# Patient Record
Sex: MALE | Race: OTHER | NOT HISPANIC OR LATINO
[De-identification: names, ages, dates, MRNs, and addresses within clinical notes are randomized per-mention and may not be internally consistent; named-entity substitution may affect disease eponyms.]

---
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
## 2024-05-09 ENCOUNTER — EMERGENCY (EMERGENCY)
Facility: HOSPITAL | Age: 19
LOS: 0 days | Discharge: HOME | End: 2024-05-10
Attending: EMERGENCY MEDICINE
Payer: MEDICAID

## 2024-05-09 VITALS
WEIGHT: 154.98 LBS | HEART RATE: 73 BPM | TEMPERATURE: 98 F | SYSTOLIC BLOOD PRESSURE: 172 MMHG | OXYGEN SATURATION: 100 % | DIASTOLIC BLOOD PRESSURE: 81 MMHG | RESPIRATION RATE: 18 BRPM

## 2024-05-09 DIAGNOSIS — G47.00 INSOMNIA, UNSPECIFIED: ICD-10-CM

## 2024-05-09 DIAGNOSIS — F43.22 ADJUSTMENT DISORDER WITH ANXIETY: ICD-10-CM

## 2024-05-09 DIAGNOSIS — Z20.822 CONTACT WITH AND (SUSPECTED) EXPOSURE TO COVID-19: ICD-10-CM

## 2024-05-09 DIAGNOSIS — R45.851 SUICIDAL IDEATIONS: ICD-10-CM

## 2024-05-09 LAB
ANION GAP SERPL CALC-SCNC: 10 MMOL/L — SIGNIFICANT CHANGE UP (ref 7–14)
APAP SERPL-MCNC: <5 UG/ML — LOW (ref 10–30)
APPEARANCE UR: CLEAR — SIGNIFICANT CHANGE UP
BASOPHILS # BLD AUTO: 0.07 K/UL — SIGNIFICANT CHANGE UP (ref 0–0.2)
BASOPHILS NFR BLD AUTO: 1 % — SIGNIFICANT CHANGE UP (ref 0–1)
BILIRUB UR-MCNC: NEGATIVE — SIGNIFICANT CHANGE UP
BUN SERPL-MCNC: 18 MG/DL — SIGNIFICANT CHANGE UP (ref 10–20)
CALCIUM SERPL-MCNC: 9.5 MG/DL — SIGNIFICANT CHANGE UP (ref 8.4–10.5)
CHLORIDE SERPL-SCNC: 104 MMOL/L — SIGNIFICANT CHANGE UP (ref 98–110)
CO2 SERPL-SCNC: 26 MMOL/L — SIGNIFICANT CHANGE UP (ref 17–32)
COLOR SPEC: YELLOW — SIGNIFICANT CHANGE UP
CREAT SERPL-MCNC: 1 MG/DL — SIGNIFICANT CHANGE UP (ref 0.7–1.5)
DIFF PNL FLD: NEGATIVE — SIGNIFICANT CHANGE UP
EGFR: 111 ML/MIN/1.73M2 — SIGNIFICANT CHANGE UP
EOSINOPHIL # BLD AUTO: 0.15 K/UL — SIGNIFICANT CHANGE UP (ref 0–0.7)
EOSINOPHIL NFR BLD AUTO: 2.1 % — SIGNIFICANT CHANGE UP (ref 0–8)
ETHANOL SERPL-MCNC: <10 MG/DL — SIGNIFICANT CHANGE UP
GLUCOSE SERPL-MCNC: 135 MG/DL — HIGH (ref 70–99)
GLUCOSE UR QL: NEGATIVE MG/DL — SIGNIFICANT CHANGE UP
HCT VFR BLD CALC: 43.7 % — SIGNIFICANT CHANGE UP (ref 42–52)
HGB BLD-MCNC: 15.1 G/DL — SIGNIFICANT CHANGE UP (ref 14–18)
IMM GRANULOCYTES NFR BLD AUTO: 0.3 % — SIGNIFICANT CHANGE UP (ref 0.1–0.3)
KETONES UR-MCNC: NEGATIVE MG/DL — SIGNIFICANT CHANGE UP
LEUKOCYTE ESTERASE UR-ACNC: NEGATIVE — SIGNIFICANT CHANGE UP
LYMPHOCYTES # BLD AUTO: 2.82 K/UL — SIGNIFICANT CHANGE UP (ref 1.2–3.4)
LYMPHOCYTES # BLD AUTO: 39.6 % — SIGNIFICANT CHANGE UP (ref 20.5–51.1)
MCHC RBC-ENTMCNC: 28.5 PG — SIGNIFICANT CHANGE UP (ref 27–31)
MCHC RBC-ENTMCNC: 34.6 G/DL — SIGNIFICANT CHANGE UP (ref 32–37)
MCV RBC AUTO: 82.6 FL — SIGNIFICANT CHANGE UP (ref 80–94)
MONOCYTES # BLD AUTO: 0.67 K/UL — HIGH (ref 0.1–0.6)
MONOCYTES NFR BLD AUTO: 9.4 % — HIGH (ref 1.7–9.3)
NEUTROPHILS # BLD AUTO: 3.4 K/UL — SIGNIFICANT CHANGE UP (ref 1.4–6.5)
NEUTROPHILS NFR BLD AUTO: 47.6 % — SIGNIFICANT CHANGE UP (ref 42.2–75.2)
NITRITE UR-MCNC: NEGATIVE — SIGNIFICANT CHANGE UP
NRBC # BLD: 0 /100 WBCS — SIGNIFICANT CHANGE UP (ref 0–0)
PH UR: 6.5 — SIGNIFICANT CHANGE UP (ref 5–8)
PLATELET # BLD AUTO: 269 K/UL — SIGNIFICANT CHANGE UP (ref 130–400)
PMV BLD: 9.9 FL — SIGNIFICANT CHANGE UP (ref 7.4–10.4)
POTASSIUM SERPL-MCNC: 3.8 MMOL/L — SIGNIFICANT CHANGE UP (ref 3.5–5)
POTASSIUM SERPL-SCNC: 3.8 MMOL/L — SIGNIFICANT CHANGE UP (ref 3.5–5)
PROT UR-MCNC: SIGNIFICANT CHANGE UP MG/DL
RBC # BLD: 5.29 M/UL — SIGNIFICANT CHANGE UP (ref 4.7–6.1)
RBC # FLD: 11.8 % — SIGNIFICANT CHANGE UP (ref 11.5–14.5)
SALICYLATES SERPL-MCNC: <0.3 MG/DL — LOW (ref 4–30)
SARS-COV-2 RNA SPEC QL NAA+PROBE: SIGNIFICANT CHANGE UP
SODIUM SERPL-SCNC: 140 MMOL/L — SIGNIFICANT CHANGE UP (ref 135–146)
SP GR SPEC: 1.02 — SIGNIFICANT CHANGE UP (ref 1–1.03)
UROBILINOGEN FLD QL: 1 MG/DL — SIGNIFICANT CHANGE UP (ref 0.2–1)
WBC # BLD: 7.13 K/UL — SIGNIFICANT CHANGE UP (ref 4.8–10.8)
WBC # FLD AUTO: 7.13 K/UL — SIGNIFICANT CHANGE UP (ref 4.8–10.8)

## 2024-05-09 PROCEDURE — 99285 EMERGENCY DEPT VISIT HI MDM: CPT

## 2024-05-09 PROCEDURE — 81003 URINALYSIS AUTO W/O SCOPE: CPT

## 2024-05-09 PROCEDURE — 93010 ELECTROCARDIOGRAM REPORT: CPT

## 2024-05-09 PROCEDURE — 85025 COMPLETE CBC W/AUTO DIFF WBC: CPT

## 2024-05-09 PROCEDURE — 93005 ELECTROCARDIOGRAM TRACING: CPT

## 2024-05-09 PROCEDURE — 87635 SARS-COV-2 COVID-19 AMP PRB: CPT

## 2024-05-09 PROCEDURE — 80307 DRUG TEST PRSMV CHEM ANLYZR: CPT

## 2024-05-09 PROCEDURE — 80048 BASIC METABOLIC PNL TOTAL CA: CPT

## 2024-05-09 NOTE — ED PROVIDER NOTE - ATTENDING APP SHARED VISIT CONTRIBUTION OF CARE
18-year-old male without any past medical history who presents with a weeklong symptoms of sleep disturbance with inability to function at school.  Normally patient does well at school and is in college but for the last week he has been unable to continue his studies unable to finish finals his parents came and picked him up from school today brought him straight to the emergency department here from [place].  He was seen in ER at his school and was given Valium to help with the sleep disturbances.  The patient states that over the past week he is focused on his difficulty with sleep and unable to function otherwise during the daytime or at night.  He has tried using alcohol and marijuana to help with the symptoms last alcohol use was Saturday.  Last marijuana use was yesterday.  He states that he does think about harming himself because of his lack of sleep  And difficulty with functioning.  He denies any hallucinations.  Denies any other substance use.  On exam the patient appears calm and cooperative his speech is fluent he does endorse passive SI but no HI plan is to check labs and consult psychiatry

## 2024-05-09 NOTE — ED ADULT NURSE NOTE - CHIEF COMPLAINT QUOTE
pt states hes unable to sleep for 2 weeks. pt states he feels like his mind has been racing, endorses passive suicidal ideations related to this situation.

## 2024-05-09 NOTE — ED PROVIDER NOTE - EKG/XRAY ADDITIONAL INFORMATION
independent interpretation of the ekg performed by Dr. Parker Chambers shows independent interpretation of the ekg performed by Dr. Parker Chambers shows Normal sinus rhythm heart rate 63  QRS 84 QTc 411 no elevations or depressions

## 2024-05-09 NOTE — ED ADULT TRIAGE NOTE - CHIEF COMPLAINT QUOTE
pt states hes unable to sleep for 2 weeks. pt states he feels like his mind has been racing, endorses passive suicidal thoughts. pt states hes unable to sleep for 2 weeks. pt states he feels like his mind has been racing, endorses passive suicidal ideations related to this situation.

## 2024-05-09 NOTE — ED PROVIDER NOTE - PATIENT PORTAL LINK FT
You can access the FollowMyHealth Patient Portal offered by [facility] by registering at the following website: [email or web address]. By joining [employer]’s FollowMyHealth portal, you will also be able to view your health information using other applications (apps) compatible with our system.

## 2024-05-09 NOTE — ED PROVIDER NOTE - OBJECTIVE STATEMENT
Patient is a 18-year-old male with no past medical history presents for evaluation of 2 to 3 weeks of insomnia and passing suicidal thoughts.  Patient notes worsening symptoms of insomnia and has been unable to get a full nights rest over the past few weeks.  He has attempted melatonin, alcohol, marijuana use to help relax him and perform sleep as well as visiting in the ED near his school at [place] which gave him Valium and also did not relieve his symptoms.  He states that his suicidal thoughts have been worsening the more tired he becomes better passing and has no plans to carry them out at this time.  He denies any homicidal ideations or audiovisual hallucinations.  He otherwise only admits to racing thoughts and lack of focus during the school term at this time.  Patient's mother Anupama present for collateral who notes that his symptoms did worsen after joining fraternity and has noticed decline in his behavior since going back to school the start of this term.    Mother Anupama    Girlfriend Michael

## 2024-05-09 NOTE — ED PROVIDER NOTE - PROGRESS NOTE DETAILS
KA - Telepsychiatry consult placed.  Added to list then evaluated.  Recommended Seroquel which has been ordered and reassessment in the morning.

## 2024-05-09 NOTE — ED ADULT NURSE NOTE - BEFAST SCREENING
Sinusitis (Antibiotic Treatment)    The sinuses are air-filled spaces within the bones of the face. They connect to the inside of the nose. Sinusitis is an inflammation of the tissue that lines the sinuses. Sinusitis can occur during a cold.  It can also · Do not use nasal rinses or irrigation during an acute sinus infection, unless your healthcare provider tells you to. Rinsing may spread the infection to other areas in your sinuses.   · Use acetaminophen or ibuprofen to control pain, unless another pain m Positive

## 2024-05-09 NOTE — ED PROVIDER NOTE - CLINICAL SUMMARY MEDICAL DECISION MAKING FREE TEXT BOX
Diagnostic testing reviewed.  Patient is medically clear for psychiatric examination.  Case discussed with psychiatry.  We mutually agree the patient is stable for outpatient treatment.

## 2024-05-10 VITALS
RESPIRATION RATE: 17 BRPM | DIASTOLIC BLOOD PRESSURE: 67 MMHG | SYSTOLIC BLOOD PRESSURE: 136 MMHG | TEMPERATURE: 97 F | HEART RATE: 66 BPM | OXYGEN SATURATION: 97 %

## 2024-05-10 DIAGNOSIS — F43.22 ADJUSTMENT DISORDER WITH ANXIETY: ICD-10-CM

## 2024-05-10 PROCEDURE — 90792 PSYCH DIAG EVAL W/MED SRVCS: CPT | Mod: 93

## 2024-05-10 RX ORDER — QUETIAPINE FUMARATE 200 MG/1
50 TABLET, FILM COATED ORAL ONCE
Refills: 0 | Status: COMPLETED | OUTPATIENT
Start: 2024-05-10 | End: 2024-05-10

## 2024-05-10 RX ORDER — QUETIAPINE FUMARATE 200 MG/1
1 TABLET, FILM COATED ORAL
Qty: 30 | Refills: 0
Start: 2024-05-10

## 2024-05-10 RX ADMIN — QUETIAPINE FUMARATE 50 MILLIGRAM(S): 200 TABLET, FILM COATED ORAL at 01:55

## 2024-05-10 NOTE — ED BEHAVIORAL HEALTH ASSESSMENT NOTE - SUMMARY
18M, just finished 10th grade at [employer], now living w family, no PMH, denies psych hx, no suicide attempts or hospitalizations, never in psychiatric care, daily cannabis user until recently, now BIB mother for evaluation and treatment of suicide ideation in the setting of 2 weeks of insomnia.     Given lack of prior psychiatric history and lack of family history, his suicide ideation seems to be secondary to insomnia which has likely been caused by a combination of anxiety and substance use. He denies symptoms that would be consistent with manic episode and he would most likely benefit from sedating antipsychotic followed by re-evaluation

## 2024-05-10 NOTE — ED BEHAVIORAL HEALTH ASSESSMENT NOTE - HPI (INCLUDE ILLNESS QUALITY, SEVERITY, DURATION, TIMING, CONTEXT, MODIFYING FACTORS, ASSOCIATED SIGNS AND SYMPTOMS)
18M, just finished 10th grade at [employer], now living w family, no PMH, denies psych hx, no suicide attempts or hospitalizations, never in psychiatric care, daily cannabis user until recently, now BIB mother for evaluation and treatment of suicide ideation in the setting of 2 weeks of insomnia.     Patient states that prior to 2-3 weeks ago he never had any psychiatric issues, would sleep 8 hours per night, though smoked cannabis most nights since january. he states that he started to cut back in april,  and began sleeping 1-2 hours per night around 2 weeks ago, last used Mj on 4/29. He states that he was tired during the day, couldn't pay attention to anything, would ruminate and obsess over trying to relax. He states that he tried OTC medications like melotonin, tried valium that was prescribed by the ED near school but nothing worked. He states that because of the insomnia he has begun to have thoughts of killing himself, states that a few days ago he held a bottle of benadryl and thought about killing himself, two days ago he was with a friend [place] and thought about jumping in. He states that the insomnia is the only reason for the suicide ideation and if this were better he would not have these thoughts, but he worries that he will not feel better again. He denies auditory, visual, or tactile hallucinations, denies drugs other than MJ, states that he drinks 2-3 times per week, would not quantify. Denies family hx of mental illness. Denies feeling euphoric or angry, denies pressured speech, denies increase in goal directed behavior, does endorse racing thoughts. Discussed options of admission, discharge to followup, or to have patient take medication for sleep tonight for re-evaluatoin tomorrow morning, recommending the latter. Patient verbalized understanding and opted to try seroquel tonight with re-evaluation tomorrow.

## 2024-05-10 NOTE — ED BEHAVIORAL HEALTH ASSESSMENT NOTE - PSYCHIATRIC ISSUES AND PLAN (INCLUDE STANDING AND PRN MEDICATION)
no
seroquel 50mg PO x 1 now. For agitation, haldol 5mg with ativan 2mg and benadryl 50mg PO or IM if severe

## 2024-05-10 NOTE — ED BEHAVIORAL HEALTH PROGRESS NOTE - RISK ASSESSMENT
risk factors include recent aborted suicide attempts, suicide ideation, insomnia, lack of outpatient care. protective factors include lack of access to firearms, lack of psychosis, stable housing, supportive family risk factors include recent aborted suicide attempts, suicide ideation, insomnia, lack of outpatient care. protective factors include lack of access to firearms, stable housing, supportive family

## 2024-05-10 NOTE — ED BEHAVIORAL HEALTH PROGRESS NOTE - SUMMARY
18M, just finished 10th grade at [employer], now living w family, no PMH, denies psych hx, no suicide attempts or hospitalizations, never in psychiatric care, daily cannabis user until recently, now BIB mother for evaluation and treatment of suicide ideation in the setting of 2 weeks of insomnia.     Given lack of prior psychiatric history and lack of family history, his suicide ideation seems to be secondary to insomnia which has likely been caused by a combination of anxiety and substance use. He denies symptoms that would be consistent with manic episode and he would most likely benefit from sedating antipsychotic followed by re-evaluation see prior assessment

## 2024-05-10 NOTE — ED BEHAVIORAL HEALTH PROGRESS NOTE - CASE SUMMARY/FORMULATION (CLEARLY DOCUMENT RATIONALE FOR DISPOSITION CHANGE)
17 yo male, just finished 2nd year of college ([employer]), with no PMH, no formal psych history, does have history of cannabis use, who presents for weeks of new onset insomnia of unclear origin, contributing to mood symptoms. On interview today, patient appears to describe a constellation of symptoms which he himself attributes as sequelae of his poor sleep, but there are also concerning for possible psychotic like symptoms in the context of cannabis use. He describes noticing isolative behavior previously despite thinking his life is going well, feeling a loss of sense of self/identity, starting to feel "not excited" about anything and feeling an emptiness but denying any clear depressive symptoms, and noting difficulty thinking clearly. These all could be explained by his poor sleep, but the unexplained new insomnia and how extreme it seems suggest patient's cannabis use could be contributing to other related psychiatric symptoms, not just insomnia. That said, patient has a good support system and wants a trial of outpatient treatment, and he is not exhibiting any acute danger at this time. Mom corroborates she does not have acute safety concerns while knowing full history. Patient can safely try management in the outpatient setting, and if does not improve enough or condition worsens, can be referred back to ER for possibly more acute care.    - safe for discharge  - outpatient appointment at [facility] made for 5/15  - pt to be discharged with 5 days of Seroquel 25mg qhs PRN for insomnia

## 2024-05-10 NOTE — ED BEHAVIORAL HEALTH ASSESSMENT NOTE - DIFFERENTIAL
adjustment disorder, r/o mdd, bipolar, schizoaffective, schizophrenia, substance induced anxiety disorder, RADHA

## 2024-05-10 NOTE — ED BEHAVIORAL HEALTH ASSESSMENT NOTE - DETAILS
Would not impact clinical decisionmaking at this time See above Called mother Discussed with ED attending Left message with mother

## 2024-05-10 NOTE — ED BEHAVIORAL HEALTH PROGRESS NOTE - DETAILS:
On reassessment today, patient remains very cooperative but appears mildly anxious. He reports feeling that the Seroquel last night seemed to have an effect, but he still had trouble sleeping overall mainly because of the noisy environment and people walking in and out. Patient  On reassessment today, patient remains very cooperative but appears mildly anxious. He reports feeling that the Seroquel last night seemed to have an effect, but he still had trouble sleeping overall mainly because of the noisy environment and people walking in and out.    I discussed with patient again the possibility of psychiatric admission to address his symptoms more quickly. He is initially agreeable but later asks whether he can try medications at home, as he thinks being in a more comfortable environment would help. He also thinks since he just got home from college, getting adjusted in time would help with his sleep as well.    His insomnia and related symptoms were explored again in detail. Patient reports he thinks it could have been longer than 2 weeks since his insomnia began. He says it is difficult to quantify the amount of sleep he is averaging. Patient reports as a result of his poor sleep feeling irritable, having difficulty concentrating. He also mentions feeling like he is experiencing "depersonalization," and when asked to elaborate, says it is like he is "not excited about anything," like his "personality is dead." Patient denies any other clear ideas of reference or paranoid ideation. Patient denies any wish to die and states his life before had been going well, he was doing well at school, and there haven't been any acute stressors. He says since he has had trouble sleeping, his mood has also been more "sad," sometimes he feels like he is not alive and feels empty. Denies urges to harm himself. I confronted him about his mention of having Benadryl in his hands and briefly thinking of overdosing, and patient reports he "would not put anyone through that," and if he were to experience these feelings again, he would tell someone as he did his girlfriend or seek help himself.    I spoke with patient's mom who is aware of the incident with the Benadryl and believes patient was expressing his desperation for help with insomnia, but she doesn't have any safety concerns. She is comfortable with patient being discharged with outpatient follow-up. She also suspects pt's cannabis use may have been contributing. I provided her with education on how cannabis can contribute not only to insomnia, but psychosis in some people, given patient's symptoms outline above. Mom will supervise patient more closely the next few days and refer him back to ER if any new concerns arise, or if he does not improve.

## 2024-05-10 NOTE — ED BEHAVIORAL HEALTH PROGRESS NOTE - DETAILS
discussed with mom pt angela to safety, would tell someone immediately or call 911 if experiencing any suicidal thoughts

## 2024-05-10 NOTE — ED BEHAVIORAL HEALTH ASSESSMENT NOTE - RISK ASSESSMENT
risk factors include recent aborted suicide attempts, suicide ideation, insomnia, lack of outpatient care. protective factors include lack of access to firearms, lack of psychosis, stable housing, supportive family

## 2024-05-15 ENCOUNTER — OUTPATIENT (OUTPATIENT)
Dept: OUTPATIENT SERVICES | Facility: HOSPITAL | Age: 19
LOS: 1 days | End: 2024-05-15
Payer: MEDICAID

## 2024-05-15 ENCOUNTER — APPOINTMENT (OUTPATIENT)
Dept: PSYCHIATRY | Facility: CLINIC | Age: 19
End: 2024-05-15

## 2024-05-15 DIAGNOSIS — F43.23 ADJUSTMENT DISORDER WITH MIXED ANXIETY AND DEPRESSED MOOD: ICD-10-CM

## 2024-05-15 DIAGNOSIS — F33.1 MAJOR DEPRESSIVE DISORDER, RECURRENT, MODERATE: ICD-10-CM

## 2024-05-15 PROBLEM — Z00.00 ENCOUNTER FOR PREVENTIVE HEALTH EXAMINATION: Status: ACTIVE | Noted: 2024-05-15

## 2024-05-15 PROCEDURE — 90791 PSYCH DIAGNOSTIC EVALUATION: CPT

## 2024-05-16 DIAGNOSIS — F33.1 MAJOR DEPRESSIVE DISORDER, RECURRENT, MODERATE: ICD-10-CM

## 2024-05-17 ENCOUNTER — NON-APPOINTMENT (OUTPATIENT)
Age: 19
End: 2024-05-17

## 2024-05-20 NOTE — DISCUSSION/SUMMARY
[FreeTextEntry1] : Therapist made outreach to patient this afternoon via phone due to patient not making contact with therapist as planned. Patient answered the phone. He did not request appointment for psychiatric evaluation/ therapy at [facility] clinic, reporting that he and his mother are in the process of exploring other/private resources in the community. They requested referral resources and therapist offered private clinics/ providers on referral list (resources include: [facility], [place], [facility], [facility], [facility], etc.). Patient and his mother (who could be heard in the background), verbalized appreciation for resources provided, sharing that they had already left messages for a few providers on this list. This therapist did inform patient of the importance of consistent care (given the nature of his referral and the limited time he has over summer break before returning to school for fall semester). Therapist reminded patient that he can contact her direct should be opt to proceed with referral/ services at [facility]. Based on the above information, no case made due to patient's request to obtain mental health services elsewhere.

## 2024-05-21 PROBLEM — F43.23 ADJUSTMENT DISORDER WITH MIXED ANXIETY AND DEPRESSED MOOD: Status: ACTIVE | Noted: 2024-05-21

## 2024-05-21 NOTE — HISTORY OF PRESENT ILLNESS
[Suicidal Behavior/Ideation] : suicidal behavior/ideation [FreeTextEntry1] : Ji is a 19-year-old male who [place] visit last Thursday in the context of difficulty managing 2-week insomnia, impacting overall functioning and schoolwork, resulting in suicidal ideations. Ji notes that worsening symptoms of insomnia and reported being unable to get a full night sleep over the past few weeks. This reportedly has impacted academics (patient just finished Sophomore year of college).  He reported use of melatonin, alcohol and marijuana to help him relax and reported recent ED visit near college. He also was reportedly prescribed Valium, which did not relieve his symptoms. Patient denied any homicidal ideations or auditory/ visual hallucinations. He also disclosed racing thoughts and difficulty focusing during the end of school semester.    Ji endorsed feeling depressed and self-conscious, acknowledging smoking more weed this semester; insightful to a connection between marijuana use and mood. He disclosed smoking marijuana for the past year, adding that his smoking increased to daily or every other day (as opposed to less frequently-weekly). He described quantity of use by estimating 1 or 1.5 bowls. Ji reported that on 4/29, he stopped smoking (when the insomnia started). Last week, smoked marijuana to try to help himself sleep (prescriptions prescribed/ supplements reportedly did not help),noting that marijuana did not help with sleep, instead it resulted in "increased anxiety." He shared goal of not continuing use of marijuana. He endorsed racing, ruminating thoughts that interfere with his sleep. Ji also cited decreased interest in positive events/ hobbies (i.e. soccer). He reported feeling like "a shell of myself" and feeling "numb." He reported being "way more in my head" that I usually am." He added that preparing for final exams at school was stressful (he completed all but 1).   Ji noted some improvement with sleep last night, reporting sleeping 4-5 hours (several days of some improvement with sleep). His PMD recently prescribed Gabapentin.  [FreeTextEntry2] : No history of mental health treatment prior to recent ED visit. No therapy/ psychiatry. No psychiatric hospitalizations. No prior mental health medications prescribed.   Saw counselor at school 1X to discuss sleep hygiene strategies. This was at the end of the semester, so patient did not continue. He also reported that counselor's suggestions did not help with sleep either.  [FreeTextEntry3] : Valim 2MG (HS) (prescribed by ED at school- this occurred about a week before he came to our hospital). Patient reports that he took medication, and it didn't help so patient returned to this ED and he reports that MD told him to take additional Valium.  [facility] MD prescribed Seroquel. Primary doctor advised patient to discontinue Seroquel prescribed Gabapentin. He noted Gabapentin as helpful "a bit" along with trying to adhere to sleep schedule. Melatonin and valerian root are supplements that patient has been utilizing, not finding these as helpful.

## 2024-05-21 NOTE — PSYCHOSOCIAL ASSESSMENT
[All substances negative except as specified below] : All substances negative except as specified below [_____] : Last Use: [unfilled]  [Yes (add details)] : Patient attended school, home tutoring, or received education instruction at anytime in the past three months? Yes [Bachelor's Degree] : Bachelor's Degree [No] : No [Yes (select details below)] : Have you ever experienced this type of event? Yes [FreeTextEntry1] : Patient endorsed change in marijuana occurred over the past year. He cited that in Freshman year, he would limit marijuana use to 1X/ month, noting an increase in marijuana use of starting in the beginning of Sophomore year to a few times/ week and this semester- a further increase of every-other-day to daily.  Social drinking- at college parties. Patient minimized this as a problem, stating that he has been making an effort to decrease alcohol use as of late due to increase in marijuana use. Patient denied drinking alone.  [had nightmares about the event(s) or thought about the event(s) when you did not want] : did not have nightmares and/or unwanted thoughts about the events [tried hard not to think about the event(s) or went out of your way to avoid situations that reminded you of the event] : did not need to avoid thinking about events, did not need to avoid situations that might remind patient of events [has been constantly on guard, watchful, or easily startled] : has not been constantly on guard, watchful, or easily startled [felt numb or detached from people, activities, or your surrounding] : has not felt numb or detached from people, activities, or surroundings [felt guilty or unable to stop blaming yourself or others for the event(s) or any problems the event(s) may have caused] : has not felt guilty or unable to stop blaming self or others for event(s), or any problems the event(s) may have caused

## 2024-05-21 NOTE — FAMILY HISTORY
[FreeTextEntry1] : Family composition: Mom, grandparents are main support system. Dad lives separately but not much contact with him. Mom/ Dad  about 5 years since.  Family history and background: Patient was born in [place], [place]. Moved to [place] at 2 years old. Patient is an only child.  Pertinent Family Medical, MH and Substance Use History including Adult Child of Alcoholic and child of substance abuse status; history of cancer and heart disease:  Patient unaware of family history of mental health concerns. Paternal grandfather drank alcohol according to patient.

## 2024-05-21 NOTE — HISTORY OF PRESENT ILLNESS
[Suicidal Behavior/Ideation] : suicidal behavior/ideation [FreeTextEntry1] : Ji is a 19-year-old male who [facility] following visit last Thursday in the context of difficulty managing 2-week insomnia, impacting overall functioning and schoolwork, resulting in suicidal ideations. Ji notes that worsening symptoms of insomnia and reported being unable to get a full night sleep over the past few weeks. This reportedly has impacted academics (patient just finished Sophomore year of college).  He reported use of melatonin, alcohol and marijuana to help him relax and reported recent ED visit near college. He also was reportedly prescribed Valium, which did not relieve his symptoms. Patient denied any homicidal ideations or auditory/ visual hallucinations. He also disclosed racing thoughts and difficulty focusing during the end of school semester.    Ji endorsed feeling depressed and self-conscious, acknowledging smoking more weed this semester; insightful to a connection between marijuana use and mood. He disclosed smoking marijuana for the past year, adding that his smoking increased to daily or every other day (as opposed to less frequently-weekly). He described quantity of use by estimating 1 or 1.5 bowls. Ji reported that on 4/29, he stopped smoking (when the insomnia started). Last week, smoked marijuana to try to help himself sleep (prescriptions prescribed/ supplements reportedly did not help),noting that marijuana did not help with sleep, instead it resulted in "increased anxiety." He shared goal of not continuing use of marijuana. He endorsed racing, ruminating thoughts that interfere with his sleep. Ji also cited decreased interest in positive events/ hobbies (i.e. soccer). He reported feeling like "a shell of myself" and feeling "numb." He reported being "way more in my head" that I usually am." He added that preparing for final exams at school was stressful (he completed all but 1).   Ji noted some improvement with sleep last night, reporting sleeping 4-5 hours (several days of some improvement with sleep). His PMD recently prescribed Gabapentin.  [FreeTextEntry2] : No history of mental health treatment prior to recent ED visit. No therapy/ psychiatry. No psychiatric hospitalizations. No prior mental health medications prescribed.   Saw counselor at school 1X to discuss sleep hygiene strategies. This was at the end of the semester, so patient did not continue. He also reported that counselor's suggestions did not help with sleep either.  [FreeTextEntry3] : Valim 2MG (HS) (prescribed by ED at school- this occurred about a week before he came to our hospital). Patient reports that he took medication, and it didn't help so patient returned to this ED and he reports that MD told him to take additional Valium.  [facility] MD prescribed Seroquel. Primary doctor advised patient to discontinue Seroquel prescribed Gabapentin. He noted Gabapentin as helpful "a bit" along with trying to adhere to sleep schedule. Melatonin and valerian root are supplements that patient has been utilizing, not finding these as helpful.

## 2024-05-21 NOTE — ADDENDUM
[FreeTextEntry1] : At the end of intake session, patient shared that he and his mom would be exploring other mental health resources in the community. He stated that he planned to return to school at the end of the summer and was looking for treatment that would be able to be consistent. It also appeared that patient was looking for therapy primarily, as his primary physician was prescribing mental health medication. Even was asked to contact this therapist on Friday 5/17 to specify goal (to continue treatment at [facility] vs. exploring alternative resources) and devise plan.

## 2024-05-21 NOTE — REASON FOR VISIT
[Number can be texted] : number can be texted [OK  to leave message] : OK  to leave message [Other:___] : [unfilled] [[facility] Provider/Facility] : [facility] Provider/Facility [Patient] : Patient [Prior Medical Records] : Prior Medical Records [FreeTextEntry4] : 11:12AM [FreeTextEntry3] : [email or web address] [FreeTextEntry5] : English [FreeTextEntry6] : Ji  [FreeTextEntry7] : He/ Him [FreeTextEntry2] : Outpatient mental health services.  [FreeTextEntry1] : Patient presented to clinic today for intake following recent ED visit last Thursday. "I wasn't able to sleep at all for about 2 weeks leading up to that." Difficulty functioning at school as a result. Impacted school prior to the end of the semester. "I just felt like I was trapped." I felt like I just don't want to deal with it anymore, endorsing passive suicidal ideations at the time. Patient acknowledges now that "this (suicide) is not the correct option."

## 2024-05-21 NOTE — DISCUSSION/SUMMARY
[FreeTextEntry1] : Ji is a 19-year-old male who [facility] following visit last Thursday in the context of difficulty managing 2-week insomnia, impacting overall functioning and schoolwork, resulting in suicidal ideations. Ji notes that worsening symptoms of insomnia and reported being unable to get a full night sleep over the past few weeks. This reportedly has impacted academics (patient just finished Sophomore year of college).  He reported use of melatonin, alcohol and marijuana to help him relax and reported recent ED visit near college. He also was reportedly prescribed Valium, which did not relieve his symptoms. Patient denied any homicidal ideations or auditory/ visual hallucinations. He also disclosed racing thoughts and difficulty focusing during the end of school semester.    Ji endorsed feeling depressed and self-conscious, acknowledging smoking more weed this semester; insightful to a connection between marijuana use and mood. He disclosed smoking marijuana for the past year, adding that his smoking increased to daily or every other day (as opposed to less frequently-weekly). He described quantity of use by estimating 1 or 1.5 bowls. Ji reported that on 4/29, he stopped smoking (when the insomnia started). Last week, smoked marijuana to try to help himself sleep (prescriptions prescribed/ supplements reportedly did not help),noting that marijuana did not help with sleep, instead it resulted in "increased anxiety." He shared goal of not continuing use of marijuana. He endorsed racing, ruminating thoughts that interfere with his sleep. Ji also cited decreased interest in positive events/ hobbies (i.e. soccer). He reported feeling like "a shell of myself" and feeling "numb." He reported being "way more in my head" that I usually am." He added that preparing for final exams at school was stressful (he completed all but 1).  [Low acute suicide risk] : Low acute suicide risk [No] : No [FreeTextEntry3] : Able to manage recent distress with support.  Patient agreeable to calling 911 or bringing himself back to ED in the event of unmanageable crisis/ life-threatening emergency.

## 2024-05-21 NOTE — RISK ASSESSMENT
[Clinical Records] : Clinical Records [Clinical Interview] : Clinical Interview [Yes] : Yes [In last 30 days] : in the last 30 days [No] : No [Yes, within past three months] : Yes, within past three months [Mood disorder] : mood disorder [Alcohol/Substance Use disorders] : alcohol/substance use disorders [Depressed mood/Anhedonia] : depressed mood/anhedonia [Global insomnia] : global insomnia [Identifies reasons for living] : identifies reasons for living [Supportive social network of family or friends] : supportive social network of family or friends [Ability to cope with stress] : ability to cope with stress [Responsibility to children, family, or others] : responsibility to children, family, or others [Engaged in work or school] : engaged in work or school [None in the patient's lifetime] : None in the patient's lifetime [TextBox_32] : At time of session, patient denied the presence of active suicidal ideations, plan, intent or safety concerns. He shared about the importance/ involvement of support system. Patient shared that suicidal ideations were not happening prior to recent episode of insomnia and he feels that they will resolve once sleep difficulties resolve.  [FreeTextEntry5] : Able to manage recent distress with support.  Patient agreeable to calling 911 or bringing self back to ED in the event of unmanageable crisis/ life-threatening emergency.

## 2024-05-21 NOTE — SOCIAL HISTORY
[FreeTextEntry1] : Legal Status  Does individual Served have a Legal Guardian, rep Payee or Conservatorship? [X] No [ ] Yes - Details: [ ]   Legal Involvement history  Does the individual have a history of or current involvement with the legal system?  [X] No [ ] Yes - Details: [ ]    Services  Have you ever served in the ?  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 Last worked about 2 weeks ago.   Developmental history: Developmental milestones within normal limits.  Sexual hx/identity Sexual History/ Concern (include sexual orientation and other relevant information): Relationship with girlfriend about 1.5. Patient acknowledged this relationship as very supportive. No concerns identified.   Race - ethnicity - culture information: [place]   Social supports (friends, Volunteers, club, AA meeting, other meetings): Patient is a member of a Fraternity at his school (insight into this playing a role in increasing marijuana)..  Meaningful Activities: Playing soccer recreationally, going out to social events/ parties, work as a  for dental office.    Spiritual Assessment Tool - [employer] F. What is your chapincito or belief? Worship but I'm not practicing.  Do you consider yourself spiritual or Taoist? I didn't really grow up with Alevism.  Is there something you believe in that gives meaning to your life? Not necessarily.  I: Is it important in your life? N/A What influence does it have on how you take care of yourself? N/A How have your beliefs influenced your behavior during this illness? What role do your beliefs play in regaining your health? N/A C. Are you part of a spiritual or Taoist community? No.  Is this of support to you and how? N/A Is there a person or group of people you really love or who are really important to you? Yes, mom, grandparents and girlfriend.  H. We have been discussing your belief and supports. What else gives you internal support? Friend group from home.  What are your sources of hope, strength, comfort and peace? Making my family proud.  What do you hold on to during difficult times? My support system (see above).  what sustains you and keeps you going? Trying to people around me happy. 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  A. How would you like me, your healthcare provider, to address these issues in your healthcare? Be aware of the above information.   SMOKING CESSATION   Patient does not smoke cigarettes.  Do you Smoke?                              [ ] Yes		[X] No Do you want to quit? 		[ ] Yes		[ ] No -ASK 	Number of cigatettes   [ ] cigars [ ]  pipe bowls [ ]  per day  	Number of ST cans/ pouches per week [ ] 	Number of years used [ ] 	How soon after you wake up do you use tobacco?  [ ] within 30 minutes      [ ] more than 30 minutes  	Previous quit attempts:  # of attempts [ ] longest quit period [ ] methods(s) used [ ] How long ago was last attempt to quit  [ ] years [ ] months  	Reasons for wanting to quit[ ] -ADVISE   about the oral benefits of quitting -ASSESS   willingness to make a quit attempt (Stage of Change)  	    [ ] Precontemplation (Stop here & Reassess next visit)	[ ] Contemplation [ ] Preparation   -ASSIST    (depending on stage of change)  	Self-Help Pamphlets & Materials 	List of local community group/individual quit programs and phone help lines 	Encourage a quit date (for those who are ready) 	Pharmacotherapy: Nicotine gum/ Lozenge/ Patch/ Inhaler/NS/Zyban/ Chantix  	RX: 	[ ]  ()  -ARRANGE  Follow up if set a quit date (with permission) Quit Date: [ ]  Phone calls or visits:  [ ] Week 1-2  Months   [ ] 1   [ ] 3   [ ] 6   [ ] 12   -Yearly Reassessment    [ ] No Changes of Smoking [ ] Change of Smoking Habit (Non-Smoker to Smoker/ Smoker to Non Smoker) (If there is change from Non Smoker to Smoker, please fill out new BRIEF TOBACCO CESSATION INTERVENTION FORM)  Date of Yearly Reassessment : [ ] Comments:  [ ]

## 2024-05-21 NOTE — REASON FOR VISIT
[Number can be texted] : number can be texted [OK  to leave message] : OK  to leave message [Other:___] : [unfilled] [[facility] Provider/Facility] : [facility] Provider/Facility [Patient] : Patient [Prior Medical Records] : Prior Medical Records [FreeTextEntry4] : 11:12AM [FreeTextEntry3] : [email or web address] [FreeTextEntry5] : English [FreeTextEntry6] : Ji  [FreeTextEntry7] : He/ Him [FreeTextEntry2] : Outpatient mental health services.  [FreeTextEntry1] : Patient presented to clinic today for intake following recent ED visit last Thursday. "I wasn't able to sleep at all for about 2 weeks leading up to that." Difficulty functioning at school as a result. Impacted school prior to the end of the semester. "I just felt like I was trapped." I felt like I just don't want to deal with it anymore, endorsing passive suicidal ideations at the time. Patient acknowledges now that "this (suicide) is not the correct option."

## 2024-05-21 NOTE — ADDENDUM
[FreeTextEntry1] : At the end of intake session, patient shared that he and his mom would be exploring other mental health resources in the community. He stated that he planned to return to school at the end of the summer and was looking for treatment that would be able to be consistent. It also appeared that patient was looking for therapy primarily, as his primary physician was prescribing mental health medication. Even was asked to contact this therapist on Friday 5/17 to specify goal (to continue treatment at [facility] vs. exploring alternative resources) and devise plan.

## 2024-05-21 NOTE — SOCIAL HISTORY
[FreeTextEntry1] : Legal Status  Does individual Served have a Legal Guardian, rep Payee or Conservatorship? [X] No [ ] Yes - Details: [ ]   Legal Involvement history  Does the individual have a history of or current involvement with the legal system?  [X] No [ ] Yes - Details: [ ]    Services  Have you ever served in the ?  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 Last worked about 2 weeks ago.   Developmental history: Developmental milestones within normal limits.  Sexual hx/identity Sexual History/ Concern (include sexual orientation and other relevant information): Relationship with girlfriend about 1.5. Patient acknowledged this relationship as very supportive. No concerns identified.   Race - ethnicity - culture information: [place]   Social supports (friends, Volunteers, club, AA meeting, other meetings): Patient is a member of a Fraternity at his school (insight into this playing a role in increasing marijuana)..  Meaningful Activities: Playing soccer recreationally, going out to social events/ parties, work as a  for dental office.    Spiritual Assessment Tool - [employer] F. What is your chapincito or belief? Nondenominational but I'm not practicing.  Do you consider yourself spiritual or Congregational? I didn't really grow up with Hinduism.  Is there something you believe in that gives meaning to your life? Not necessarily.  I: Is it important in your life? N/A What influence does it have on how you take care of yourself? N/A How have your beliefs influenced your behavior during this illness? What role do your beliefs play in regaining your health? N/A C. Are you part of a spiritual or Congregational community? No.  Is this of support to you and how? N/A Is there a person or group of people you really love or who are really important to you? Yes, mom, grandparents and girlfriend.  H. We have been discussing your belief and supports. What else gives you internal support? Friend group from home.  What are your sources of hope, strength, comfort and peace? Making my family proud.  What do you hold on to during difficult times? My support system (see above).  what sustains you and keeps you going? Trying to people around me happy. 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  A. How would you like me, your healthcare provider, to address these issues in your healthcare? Be aware of the above information.   SMOKING CESSATION   Patient does not smoke cigarettes.  Do you Smoke?                              [ ] Yes		[X] No Do you want to quit? 		[ ] Yes		[ ] No -ASK 	Number of cigatettes   [ ] cigars [ ]  pipe bowls [ ]  per day  	Number of ST cans/ pouches per week [ ] 	Number of years used [ ] 	How soon after you wake up do you use tobacco?  [ ] within 30 minutes      [ ] more than 30 minutes  	Previous quit attempts:  # of attempts [ ] longest quit period [ ] methods(s) used [ ] How long ago was last attempt to quit  [ ] years [ ] months  	Reasons for wanting to quit[ ] -ADVISE   about the oral benefits of quitting -ASSESS   willingness to make a quit attempt (Stage of Change)  	    [ ] Precontemplation (Stop here & Reassess next visit)	[ ] Contemplation [ ] Preparation   -ASSIST    (depending on stage of change)  	Self-Help Pamphlets & Materials 	List of local community group/individual quit programs and phone help lines 	Encourage a quit date (for those who are ready) 	Pharmacotherapy: Nicotine gum/ Lozenge/ Patch/ Inhaler/NS/Zyban/ Chantix  	RX: 	[ ]  ()  -ARRANGE  Follow up if set a quit date (with permission) Quit Date: [ ]  Phone calls or visits:  [ ] Week 1-2  Months   [ ] 1   [ ] 3   [ ] 6   [ ] 12   -Yearly Reassessment    [ ] No Changes of Smoking [ ] Change of Smoking Habit (Non-Smoker to Smoker/ Smoker to Non Smoker) (If there is change from Non Smoker to Smoker, please fill out new BRIEF TOBACCO CESSATION INTERVENTION FORM)  Date of Yearly Reassessment : [ ] Comments:  [ ]